# Patient Record
Sex: MALE | Race: WHITE | Employment: FULL TIME | URBAN - METROPOLITAN AREA
[De-identification: names, ages, dates, MRNs, and addresses within clinical notes are randomized per-mention and may not be internally consistent; named-entity substitution may affect disease eponyms.]

---

## 2021-05-21 ENCOUNTER — HOSPITAL ENCOUNTER (EMERGENCY)
Facility: HOSPITAL | Age: 61
Discharge: HOME | End: 2021-05-21
Payer: COMMERCIAL

## 2021-05-21 ENCOUNTER — APPOINTMENT (OUTPATIENT)
Dept: CT IMAGING | Facility: HOSPITAL | Age: 61
End: 2021-05-21
Payer: COMMERCIAL

## 2021-05-21 ENCOUNTER — APPOINTMENT (OUTPATIENT)
Dept: GENERAL RADIOLOGY | Facility: HOSPITAL | Age: 61
End: 2021-05-21
Attending: EMERGENCY MEDICINE
Payer: COMMERCIAL

## 2021-05-21 VITALS
TEMPERATURE: 98 F | OXYGEN SATURATION: 99 % | SYSTOLIC BLOOD PRESSURE: 140 MMHG | HEIGHT: 75 IN | RESPIRATION RATE: 16 BRPM | DIASTOLIC BLOOD PRESSURE: 88 MMHG | WEIGHT: 182 LBS | HEART RATE: 73 BPM | BODY MASS INDEX: 22.63 KG/M2

## 2021-05-21 DIAGNOSIS — S61.411A LACERATION OF RIGHT HAND WITHOUT FOREIGN BODY, INITIAL ENCOUNTER: ICD-10-CM

## 2021-05-21 DIAGNOSIS — S09.90XA INJURY OF HEAD, INITIAL ENCOUNTER: Primary | ICD-10-CM

## 2021-05-21 DIAGNOSIS — S60.222A CONTUSION OF LEFT HAND, INITIAL ENCOUNTER: ICD-10-CM

## 2021-05-21 PROCEDURE — 70450 CT HEAD/BRAIN W/O DYE: CPT

## 2021-05-21 PROCEDURE — 72125 CT NECK SPINE W/O DYE: CPT

## 2021-05-21 PROCEDURE — 12001 RPR S/N/AX/GEN/TRNK 2.5CM/<: CPT

## 2021-05-21 PROCEDURE — 99284 EMERGENCY DEPT VISIT MOD MDM: CPT

## 2021-05-21 PROCEDURE — 73130 X-RAY EXAM OF HAND: CPT | Performed by: EMERGENCY MEDICINE

## 2021-05-21 NOTE — ED QUICK NOTES
PT safe to DC home per MD. Rik White to dress self. DC teaching done, pt verbalizes understanding. Ambulatory with steady gait to exit.

## 2021-05-21 NOTE — ED INITIAL ASSESSMENT (HPI)
Patient here with c/o head injury and lacerations to both arms after shelving unit fell on him. States the unit weighed approximately 100lbs. Denies loc, but states he felt \"out of it after. \"

## 2021-05-22 NOTE — ED PROVIDER NOTES
Patient Seen in: [facility] AND [facility] Emergency Department    History   Patient presents with:  Trauma      HPI    The patient presents to the ED complaining of multiple injuries after a shelving unit fell off the wall onto him roughly an hour ago.   He stat following the exam.     Physical Exam  Vitals and nursing note reviewed. Constitutional:       General: He is not in acute distress. Appearance: He is well-developed. HENT:      Head: Normocephalic.       Comments: Contusion to the superior scalp no 5/21/2021  CONCLUSION:   No CT evidence for acute intracranial process.     Dictated by (CST): Scarlet Alvarez MD on 5/21/2021 at 3:35 PM     Finalized by (CST): Scarlet Alvarez MD on 5/21/2021 at 3:39 PM          CT SPINE CERVICAL (CPT=72125)    Result Date: 5 up-to-date. Procedure:  Laceration repair:  Verbal consent was obtained from the patient. Sterile technique. The 1.5 cm laceration located to the right dorsal hand was anesthetized in the usual fashion. The wound was scrubbed, draped and explored.    The